# Patient Record
Sex: FEMALE | Race: WHITE
[De-identification: names, ages, dates, MRNs, and addresses within clinical notes are randomized per-mention and may not be internally consistent; named-entity substitution may affect disease eponyms.]

---

## 2017-03-07 ENCOUNTER — HOSPITAL ENCOUNTER (OUTPATIENT)
Dept: HOSPITAL 19 - MC.RAD | Age: 72
End: 2017-03-07
Attending: INTERNAL MEDICINE
Payer: MEDICARE

## 2017-03-07 DIAGNOSIS — D24.2: ICD-10-CM

## 2017-03-07 DIAGNOSIS — D24.1: ICD-10-CM

## 2017-03-07 DIAGNOSIS — Z12.31: Primary | ICD-10-CM

## 2017-10-12 ENCOUNTER — HOSPITAL ENCOUNTER (EMERGENCY)
Dept: HOSPITAL 19 - COL.ER | Age: 72
Discharge: HOME | End: 2017-10-12
Payer: MEDICARE

## 2017-10-12 VITALS — HEIGHT: 68 IN | WEIGHT: 195.33 LBS | BODY MASS INDEX: 29.6 KG/M2

## 2017-10-12 VITALS — SYSTOLIC BLOOD PRESSURE: 149 MMHG | DIASTOLIC BLOOD PRESSURE: 88 MMHG

## 2017-10-12 VITALS — TEMPERATURE: 99 F | HEART RATE: 79 BPM

## 2017-10-12 DIAGNOSIS — G51.0: Primary | ICD-10-CM

## 2017-10-12 LAB
ADJUSTED CALCIUM: 9.4 MG/DL (ref 8.4–10.2)
ALBUMIN SERPL-MCNC: 4.1 GM/DL (ref 3.5–5)
ALP SERPL-CCNC: 70 U/L (ref 50–136)
ALT SERPL-CCNC: 26 U/L (ref 9–52)
ANION GAP SERPL CALC-SCNC: 9 MMOL/L (ref 7–16)
BASOPHILS # BLD: 0.1 10*3/UL (ref 0–0.2)
BASOPHILS NFR BLD AUTO: 1 % (ref 0–2)
BILIRUB SERPL-MCNC: 0.6 MG/DL (ref 0–1)
BUN SERPL-MCNC: 14 MG/DL (ref 7–17)
CALCIUM SERPL-MCNC: 9.5 MG/DL (ref 8.4–10.2)
CHLORIDE SERPL-SCNC: 107 MMOL/L (ref 98–107)
CO2 SERPL-SCNC: 26 MMOL/L (ref 22–30)
CREAT SERPL-SCNC: 0.76 MG/DL (ref 0.52–1.25)
CRP SERPL-MCNC: < 0.5 MG/DL (ref 0–0.9)
EOSINOPHIL # BLD: 0.1 10*3/UL (ref 0–0.7)
EOSINOPHIL NFR BLD: 1.8 % (ref 0–4)
ERYTHROCYTE [DISTWIDTH] IN BLOOD BY AUTOMATED COUNT: 12 % (ref 11.5–14.5)
GLUCOSE SERPL-MCNC: 105 MG/DL (ref 74–106)
GRANULOCYTES # BLD AUTO: 57.2 % (ref 42.2–75.2)
HCT VFR BLD AUTO: 41.2 % (ref 37–47)
HGB BLD-MCNC: 13.4 G/DL (ref 12.5–16)
INR BLD: 1 (ref 0.8–3)
LYMPHOCYTES # BLD: 2 10*3/UL (ref 1.2–3.4)
LYMPHOCYTES NFR BLD: 33.2 % (ref 20–51)
MCH RBC QN AUTO: 31 PG (ref 27–31)
MCHC RBC AUTO-ENTMCNC: 33 G/DL (ref 33–37)
MCV RBC AUTO: 95 FL (ref 80–100)
MONOCYTES # BLD: 0.4 10*3/UL (ref 0.1–0.6)
MONOCYTES NFR BLD AUTO: 6.3 % (ref 1.7–9.3)
NEUTROPHILS # BLD: 3.5 10*3/UL (ref 1.4–6.5)
PLATELET # BLD AUTO: 323 K/MM3 (ref 130–400)
PMV BLD AUTO: 10 FL (ref 7.4–10.4)
POTASSIUM SERPL-SCNC: 3.8 MMOL/L (ref 3.4–5)
PROT SERPL-MCNC: 7.3 GM/DL (ref 6.4–8.2)
PROTHROMBIN TIME: 11.3 SECONDS (ref 9.7–12.8)
RBC # BLD AUTO: 4.34 M/MM3 (ref 4.1–5.3)
SODIUM SERPL-SCNC: 142 MMOL/L (ref 137–145)
WBC # BLD AUTO: 6.1 K/MM3 (ref 4.8–10.8)

## 2017-11-17 ENCOUNTER — HOSPITAL ENCOUNTER (EMERGENCY)
Dept: HOSPITAL 19 - COL.ER | Age: 72
Discharge: HOME | End: 2017-11-17
Payer: MEDICARE

## 2017-11-17 VITALS — SYSTOLIC BLOOD PRESSURE: 190 MMHG | DIASTOLIC BLOOD PRESSURE: 91 MMHG | TEMPERATURE: 98.5 F

## 2017-11-17 VITALS — BODY MASS INDEX: 29.6 KG/M2 | WEIGHT: 195.33 LBS | HEIGHT: 67.99 IN

## 2017-11-17 VITALS — HEART RATE: 67 BPM

## 2017-11-17 DIAGNOSIS — Z90.710: ICD-10-CM

## 2017-11-17 DIAGNOSIS — Z87.891: ICD-10-CM

## 2017-11-17 DIAGNOSIS — T63.301A: Primary | ICD-10-CM

## 2018-03-20 ENCOUNTER — HOSPITAL ENCOUNTER (OUTPATIENT)
Dept: HOSPITAL 19 - MC.RAD | Age: 73
End: 2018-03-20
Attending: INTERNAL MEDICINE
Payer: MEDICARE

## 2018-03-20 DIAGNOSIS — Z12.31: Primary | ICD-10-CM

## 2019-06-04 ENCOUNTER — HOSPITAL ENCOUNTER (OUTPATIENT)
Dept: HOSPITAL 19 - MC.RAD | Age: 74
End: 2019-06-04
Attending: INTERNAL MEDICINE
Payer: MEDICARE

## 2019-06-04 DIAGNOSIS — Z12.31: Primary | ICD-10-CM

## 2022-09-29 ENCOUNTER — HOSPITAL ENCOUNTER (OUTPATIENT)
Dept: HOSPITAL 19 - MC.RAD | Age: 77
End: 2022-09-29
Attending: INTERNAL MEDICINE
Payer: MEDICARE

## 2022-09-29 DIAGNOSIS — Z12.31: Primary | ICD-10-CM

## 2023-05-01 ENCOUNTER — HOSPITAL ENCOUNTER (OUTPATIENT)
Dept: HOSPITAL 19 - COL.ER | Age: 78
Setting detail: OBSERVATION
Discharge: HOME | End: 2023-05-01
Attending: INTERNAL MEDICINE | Admitting: INTERNAL MEDICINE
Payer: MEDICARE

## 2023-05-01 VITALS — HEART RATE: 63 BPM | DIASTOLIC BLOOD PRESSURE: 53 MMHG | TEMPERATURE: 97.9 F | SYSTOLIC BLOOD PRESSURE: 102 MMHG

## 2023-05-01 VITALS — DIASTOLIC BLOOD PRESSURE: 75 MMHG | HEART RATE: 59 BPM | TEMPERATURE: 98.3 F | SYSTOLIC BLOOD PRESSURE: 119 MMHG

## 2023-05-01 VITALS — BODY MASS INDEX: 26.23 KG/M2 | HEIGHT: 67.99 IN | WEIGHT: 173.06 LBS

## 2023-05-01 VITALS — SYSTOLIC BLOOD PRESSURE: 119 MMHG

## 2023-05-01 VITALS — SYSTOLIC BLOOD PRESSURE: 102 MMHG

## 2023-05-01 DIAGNOSIS — I11.0: ICD-10-CM

## 2023-05-01 DIAGNOSIS — I34.0: ICD-10-CM

## 2023-05-01 DIAGNOSIS — Z79.899: ICD-10-CM

## 2023-05-01 DIAGNOSIS — J44.9: ICD-10-CM

## 2023-05-01 DIAGNOSIS — C44.91: ICD-10-CM

## 2023-05-01 DIAGNOSIS — I48.91: Primary | ICD-10-CM

## 2023-05-01 DIAGNOSIS — I50.22: ICD-10-CM

## 2023-05-01 DIAGNOSIS — Z87.891: ICD-10-CM

## 2023-05-01 DIAGNOSIS — Z79.01: ICD-10-CM

## 2023-05-01 DIAGNOSIS — K64.8: ICD-10-CM

## 2023-05-01 DIAGNOSIS — F41.9: ICD-10-CM

## 2023-05-01 DIAGNOSIS — E78.5: ICD-10-CM

## 2023-05-01 DIAGNOSIS — F32.A: ICD-10-CM

## 2023-05-01 LAB
ALBUMIN SERPL-MCNC: 3.5 GM/DL (ref 3.4–4.8)
ALP SERPL-CCNC: 54 U/L (ref 40–150)
ALT SERPL-CCNC: 13 U/L (ref 0–55)
ANION GAP SERPL CALC-SCNC: 9 MMOL/L (ref 7–16)
AST SERPL-CCNC: 21 U/L (ref 5–34)
BASOPHILS # BLD: 0.1 K/MM3 (ref 0–0.2)
BASOPHILS NFR BLD AUTO: 1.2 % (ref 0–2)
BILIRUB SERPL-MCNC: 0.3 MG/DL (ref 0.2–1.2)
BUN SERPL-MCNC: 22 MG/DL (ref 10–20)
CALCIUM SERPL-MCNC: 9.5 MG/DL (ref 8.4–10.2)
CHLORIDE SERPL-SCNC: 107 MMOL/L (ref 98–107)
CO2 SERPL-SCNC: 26 MMOL/L (ref 23–31)
CREAT SERPL-SCNC: 0.99 MG/DL (ref 0.57–1.11)
EOSINOPHIL # BLD: 0.2 K/MM3 (ref 0–0.7)
EOSINOPHIL NFR BLD: 2.3 % (ref 0–4)
ERYTHROCYTE [DISTWIDTH] IN BLOOD BY AUTOMATED COUNT: 12.3 % (ref 11.5–14.5)
GLUCOSE SERPL-MCNC: 100 MG/DL (ref 70–99)
GRANULOCYTES # BLD AUTO: 54.6 % (ref 42.2–75.2)
HCT VFR BLD AUTO: 36.5 % (ref 37–47)
HGB BLD-MCNC: 12 G/DL (ref 12.5–16)
LYMPHOCYTES # BLD: 2.8 K/MM3 (ref 1.2–3.4)
LYMPHOCYTES NFR BLD: 33.6 % (ref 20–51)
MAGNESIUM SERPL-MCNC: 2 MG/DL (ref 1.6–2.6)
MCH RBC QN AUTO: 32 PG (ref 27–31)
MCHC RBC AUTO-ENTMCNC: 33 G/DL (ref 33–37)
MCV RBC AUTO: 98 FL (ref 80–100)
MONOCYTES # BLD: 0.7 K/MM3 (ref 0.1–0.6)
MONOCYTES NFR BLD AUTO: 7.9 % (ref 1.7–9.3)
NEUTROPHILS # BLD: 4.6 K/MM3 (ref 1.4–6.5)
PLATELET # BLD AUTO: 386 K/MM3 (ref 130–400)
PMV BLD AUTO: 10.2 FL (ref 7.4–10.4)
POTASSIUM SERPL-SCNC: 4.2 MMOL/L (ref 3.5–4.5)
PROT SERPL-MCNC: 6.5 GM/DL (ref 6.2–8.1)
RBC # BLD AUTO: 3.72 M/MM3 (ref 4.1–5.3)
SODIUM SERPL-SCNC: 142 MMOL/L (ref 136–145)
TROPONIN I SERPL-MCNC: 0.02 NG/ML (ref 0–0.03)
TSH SERPL DL<=0.005 MIU/L-ACNC: 2.47 UIU/ML (ref 0.35–4.94)

## 2023-05-01 PROCEDURE — G0378 HOSPITAL OBSERVATION PER HR: HCPCS

## 2023-05-01 NOTE — NUR
PATIENT ADMITTED TO ROOM 351 FROM ER. PATIENT IS ALERT AND ORIENTED X4. DENIES
PAIN. LUNGS CTA. ANXIOUS, WHICH IS BASELINE PER PATIENT. SR ON EKG AND TELE.
VITAL SIGNS STABLE. ORIENTED TO ROOM AND USE OF CALL LIGHT. DENIES NEEDS AT
THIS TIME.

## 2023-05-01 NOTE — NUR
PATIENT DISCHARGED PER ORDER. IV REMOVED WITH CATH INTACT, MINIMAL BLEEDING
NOTED, PRESSURE APPLIED UNTIL HEMOSTASIS ACHEIVED, GAUZE DRESSING APPLIED.
TELE REMOVED AND RETURNED TO CRITICAL CARE. DISCHARGE TEACHING PROVIDED ON
MEDICATIONS, DIAGNOSIS, AND FOLLOW UPS. DENIES FURTHER NEEDS OR CONCERNS.
ESCORTED OUT BY THIS RN.

## 2023-05-01 NOTE — NUR
REPORT RECEIVED FROM SISI HENAO IN ED. AYSE ABURTO WITH CARDIOLOGY NOTIFIED OF
CONSULT. PER LAMONTE, OKAY TO LEAVE CARDIZE DRIP OFF AT THIS TIME, WILL RECEIVE
NEW ORDERS SOON.